# Patient Record
(demographics unavailable — no encounter records)

---

## 2025-04-24 NOTE — HISTORY OF PRESENT ILLNESS
[FreeTextEntry1] : 45yo  for annual exam and to discuss fertility. pt has been trying for 1 year to get pregnant without success, partner has no prior children Periods are regular, spotting, and then lasting 3 days, 10 days total, not heavy or painful  PMH: Denies PSH: Cone biopsy of cervix many years ago NKDA no meds, vitamins GYN: denies fibroids, cysts, STDs, h/o miscarriage about 10 years ago, possible abnormal pap and CKC, reports history of breast mass that she treated with homeopathy, does not want to do mammo

## 2025-04-24 NOTE — DISCUSSION/SUMMARY
[FreeTextEntry1] : 43yo, ceci. exam, fertility evaluation -pap/hpv, vaginitis -highly recmomend mammo -discussed possible causes of infertility, including age and low egg reserve, labs and semenalysis given, info for [facility] given, f/u results

## 2025-04-24 NOTE — PHYSICAL EXAM
[Chaperoned Physical Exam] : A chaperone was present in the examining room during all aspects of the physical examination. [Appropriately responsive] : appropriately responsive